# Patient Record
Sex: FEMALE | Race: OTHER | HISPANIC OR LATINO | URBAN - METROPOLITAN AREA
[De-identification: names, ages, dates, MRNs, and addresses within clinical notes are randomized per-mention and may not be internally consistent; named-entity substitution may affect disease eponyms.]

---

## 2020-08-19 ENCOUNTER — EMERGENCY (EMERGENCY)
Facility: HOSPITAL | Age: 65
LOS: 1 days | Discharge: HOME | End: 2020-08-19
Attending: INTERNAL MEDICINE | Admitting: INTERNAL MEDICINE
Payer: MEDICAID

## 2020-08-19 VITALS
DIASTOLIC BLOOD PRESSURE: 82 MMHG | TEMPERATURE: 98 F | HEART RATE: 59 BPM | SYSTOLIC BLOOD PRESSURE: 148 MMHG | RESPIRATION RATE: 16 BRPM | OXYGEN SATURATION: 98 %

## 2020-08-19 VITALS
RESPIRATION RATE: 16 BRPM | SYSTOLIC BLOOD PRESSURE: 126 MMHG | TEMPERATURE: 99 F | DIASTOLIC BLOOD PRESSURE: 72 MMHG | HEART RATE: 66 BPM

## 2020-08-19 PROCEDURE — 73030 X-RAY EXAM OF SHOULDER: CPT | Mod: 26,LT

## 2020-08-19 PROCEDURE — 71046 X-RAY EXAM CHEST 2 VIEWS: CPT | Mod: 26

## 2020-08-19 PROCEDURE — 99283 EMERGENCY DEPT VISIT LOW MDM: CPT

## 2020-08-19 PROCEDURE — 73060 X-RAY EXAM OF HUMERUS: CPT | Mod: 26,LT

## 2020-08-19 RX ORDER — ACETAMINOPHEN 500 MG
2 TABLET ORAL
Qty: 24 | Refills: 0
Start: 2020-08-19

## 2020-08-19 RX ORDER — IBUPROFEN 200 MG
1 TABLET ORAL
Qty: 24 | Refills: 0
Start: 2020-08-19

## 2020-08-19 RX ORDER — IBUPROFEN 200 MG
600 TABLET ORAL ONCE
Refills: 0 | Status: COMPLETED | OUTPATIENT
Start: 2020-08-19 | End: 2020-08-19

## 2020-08-19 RX ADMIN — Medication 600 MILLIGRAM(S): at 13:48

## 2020-08-19 NOTE — ED PROVIDER NOTE - PATIENT PORTAL LINK FT
You can access the FollowMyHealth Patient Portal offered by [facility] by registering at the following website: [email or web address]. By joining [email or web address]’s FollowMyHealth portal, you will also be able to view your health information using other applications (apps) compatible with our system.

## 2020-08-19 NOTE — ED PROVIDER NOTE - NSFOLLOWUPINSTRUCTIONS_ED_ALL_ED_FT
Please follow up with your primary medical doctor within two days.  No fracture was seen on your x-rays during this emergency department visit, however occasionally small fractures may not be visible on initial x-ray, and you should consider obtaining repeat x-rays if your pain persists.  Continue to wear the sling which you were provided as you were instructed, apply ice, rest the limb and refrain from performing heavy lifting with it until your symptoms improve.  Return to the emergency department if you feel that your condition is worsening or if you experience numbness or coldness of the affect limb, paresthesias, or weakness.

## 2020-08-19 NOTE — ED PROVIDER NOTE - PROGRESS NOTE DETAILS
Dr. Mcgee: Patient feels improved.  X-rays negative for fracture.  Sling applied and the patient was educated in its use as well as aftercare for her arm.  Has a primary medical doctor and advises that she will be able to follow-up in two days.  The possibility of an occult fracture on initial x-ray was discussed and she was made aware that repeat x-rays should be obtained if pain persists.  Return precautions discussed.

## 2020-08-19 NOTE — ED ADULT TRIAGE NOTE - CHIEF COMPLAINT QUOTE
Pt c/o intermittent sharp pain to left shoulder blade area worse with movement s/p slip and fall at [employer], washing machine broke her fall Pt denies impact to head, LOC no lacerations or signs of trauma noted,

## 2020-08-19 NOTE — ED ADULT NURSE NOTE - OBJECTIVE STATEMENT
Pt presents to intake, A&Ox4, ambulatory w/o assistance, here for evaluation of left shoulder and upper back pain after a fall today. Pt denies head injury and LOC. Denies chest pain, shortness of breath, palpitations, diaphoresis, headaches, fevers, dizziness, nausea, vomiting, diarrhea, or urinary symptoms at this time. No IV placed at this time. Meds given as ordered. Will continue to monitor.

## 2020-08-19 NOTE — ED PROVIDER NOTE - OBJECTIVE STATEMENT
66 y/o F no PMHx here c/o left shoulder pain x 1 day. States slipped at the laundromat today, falling flat on her back and hitting the back of her left shoulder in the process. Able to move the shoulder, but with pain. No numbness/weakness/tingling. No head trauma. No other injuries. No LOC. Denies chest pain, shortness of breath, or any other complaints.

## 2020-08-19 NOTE — ED PROVIDER NOTE - PHYSICAL EXAMINATION
Vital signs reviewed.   CONSTITUTIONAL: Well-appearing; well-nourished; in no apparent distress. Non-toxic appearing.   HEAD: Normocephalic, atraumatic.  EYES: PERRL, EOM intact, conjunctiva and sclera WNL.  NECK/LYMPH: Supple; non-tender; no cervical lymphadenopathy.  CARD: Normal S1, S2; RRR  RESP: Normal chest excursion with respiration; breath sounds clear and equal bilaterally; no wheezes, rhonchi, or rales.  EXT/MS: moves all extremities; NO SPINAL TENDERNESS, distal pulses are normal, no pedal edema.  L SHOULDER: no swelling or visible deformity, tenderness on palpation of anterior shoulder and proximal humerus, full passive ROM but with notable pain, sensation intact, radial pulse 2+, NVI,  strength wnl  SKIN: Normal for age and race; warm; dry; good turgor; no apparent lesions or exudate noted.  NEURO: Awake, alert, oriented x 3, no gross deficits, CN II-XII grossly intact, no motor or sensory deficit noted.  PSYCH: Normal mood; appropriate affect.

## 2020-08-19 NOTE — ED PROVIDER NOTE - ATTENDING CONTRIBUTION TO CARE
Awake, Alert, Conversant.  Resting comfortably.  No skull trauma.  Neck without C-spine tenderness, full ROM intact.  Breath sounds clear in all lung fields.  Normal and regular heart rate without murmurs, rubs, or gallops.  Normal S1/S2.  Abdomen soft and nontender.  Left upper arm tender near humeral head, abduction nad adduction limited by pain, extension/flexion slow but intact.  B/L radial pulses 2+, sensation and movement intact to B/L hands.  no spinal tenderness. Pelvis stable.  No lower extremity swelling or tenderness.  Ambulates well.    Oriented and conversant with fluent speech, moving all extremities with good strength.    Dr. Mcgee: I agree with the above provided history and exam and addend/modify it as follows.  65 Denies pMHx P/W L upper arm pain following mechanical fall with direct injury, denies other areas of injury or pain.  No coldness of the affect limb or loss of sensation.  Will evaluate for fracture, possible bone bruise vs muscular strain.  Neurovascularly intact.   Plan for analgesia, imaging, re-eval.    I Kayden Mcgee MD performed a history and physical exam of the patient and discussed their management with the resident and /or advanced care provider. I reviewed the resident and /or ACP's note and agree with the documented findings and plan of care. My medical decision making and observations are found above.

## 2021-07-08 ENCOUNTER — EMERGENCY (EMERGENCY)
Facility: HOSPITAL | Age: 66
LOS: 1 days | Discharge: HOME | End: 2021-07-08
Attending: EMERGENCY MEDICINE | Admitting: EMERGENCY MEDICINE
Payer: MEDICARE

## 2021-07-08 VITALS
DIASTOLIC BLOOD PRESSURE: 85 MMHG | OXYGEN SATURATION: 98 % | RESPIRATION RATE: 18 BRPM | HEART RATE: 85 BPM | TEMPERATURE: 98 F | SYSTOLIC BLOOD PRESSURE: 125 MMHG

## 2021-07-08 VITALS
OXYGEN SATURATION: 100 % | SYSTOLIC BLOOD PRESSURE: 128 MMHG | DIASTOLIC BLOOD PRESSURE: 71 MMHG | RESPIRATION RATE: 16 BRPM | HEART RATE: 53 BPM

## 2021-07-08 PROBLEM — Z78.9 OTHER SPECIFIED HEALTH STATUS: Chronic | Status: ACTIVE | Noted: 2020-08-20

## 2021-07-08 LAB
ALBUMIN SERPL ELPH-MCNC: 4.3 G/DL — SIGNIFICANT CHANGE UP (ref 3.3–5)
ALP SERPL-CCNC: 102 U/L — SIGNIFICANT CHANGE UP (ref 40–120)
ALT FLD-CCNC: 17 U/L — SIGNIFICANT CHANGE UP (ref 4–33)
ANION GAP SERPL CALC-SCNC: 10 MMOL/L — SIGNIFICANT CHANGE UP (ref 7–14)
AST SERPL-CCNC: 19 U/L — SIGNIFICANT CHANGE UP (ref 4–32)
BASOPHILS # BLD AUTO: 0.03 K/UL — SIGNIFICANT CHANGE UP (ref 0–0.2)
BASOPHILS NFR BLD AUTO: 0.5 % — SIGNIFICANT CHANGE UP (ref 0–2)
BILIRUB SERPL-MCNC: 0.4 MG/DL — SIGNIFICANT CHANGE UP (ref 0.2–1.2)
BUN SERPL-MCNC: 13 MG/DL — SIGNIFICANT CHANGE UP (ref 7–23)
CALCIUM SERPL-MCNC: 9.5 MG/DL — SIGNIFICANT CHANGE UP (ref 8.4–10.5)
CHLORIDE SERPL-SCNC: 104 MMOL/L — SIGNIFICANT CHANGE UP (ref 98–107)
CO2 SERPL-SCNC: 25 MMOL/L — SIGNIFICANT CHANGE UP (ref 22–31)
CREAT SERPL-MCNC: 0.64 MG/DL — SIGNIFICANT CHANGE UP (ref 0.5–1.3)
EOSINOPHIL # BLD AUTO: 0.12 K/UL — SIGNIFICANT CHANGE UP (ref 0–0.5)
EOSINOPHIL NFR BLD AUTO: 1.9 % — SIGNIFICANT CHANGE UP (ref 0–6)
GLUCOSE SERPL-MCNC: 86 MG/DL — SIGNIFICANT CHANGE UP (ref 70–99)
HCT VFR BLD CALC: 40.8 % — SIGNIFICANT CHANGE UP (ref 34.5–45)
HGB BLD-MCNC: 12.2 G/DL — SIGNIFICANT CHANGE UP (ref 11.5–15.5)
IANC: 3.71 K/UL — SIGNIFICANT CHANGE UP (ref 1.5–8.5)
IMM GRANULOCYTES NFR BLD AUTO: 0.3 % — SIGNIFICANT CHANGE UP (ref 0–1.5)
LYMPHOCYTES # BLD AUTO: 2.01 K/UL — SIGNIFICANT CHANGE UP (ref 1–3.3)
LYMPHOCYTES # BLD AUTO: 31.6 % — SIGNIFICANT CHANGE UP (ref 13–44)
MCHC RBC-ENTMCNC: 24.6 PG — LOW (ref 27–34)
MCHC RBC-ENTMCNC: 29.9 GM/DL — LOW (ref 32–36)
MCV RBC AUTO: 82.3 FL — SIGNIFICANT CHANGE UP (ref 80–100)
MONOCYTES # BLD AUTO: 0.47 K/UL — SIGNIFICANT CHANGE UP (ref 0–0.9)
MONOCYTES NFR BLD AUTO: 7.4 % — SIGNIFICANT CHANGE UP (ref 2–14)
NEUTROPHILS # BLD AUTO: 3.71 K/UL — SIGNIFICANT CHANGE UP (ref 1.8–7.4)
NEUTROPHILS NFR BLD AUTO: 58.3 % — SIGNIFICANT CHANGE UP (ref 43–77)
NRBC # BLD: 0 /100 WBCS — SIGNIFICANT CHANGE UP
NRBC # FLD: 0 K/UL — SIGNIFICANT CHANGE UP
PLATELET # BLD AUTO: 271 K/UL — SIGNIFICANT CHANGE UP (ref 150–400)
POTASSIUM SERPL-MCNC: 4.3 MMOL/L — SIGNIFICANT CHANGE UP (ref 3.5–5.3)
POTASSIUM SERPL-SCNC: 4.3 MMOL/L — SIGNIFICANT CHANGE UP (ref 3.5–5.3)
PROT SERPL-MCNC: 7.5 G/DL — SIGNIFICANT CHANGE UP (ref 6–8.3)
RBC # BLD: 4.96 M/UL — SIGNIFICANT CHANGE UP (ref 3.8–5.2)
RBC # FLD: 14.3 % — SIGNIFICANT CHANGE UP (ref 10.3–14.5)
SODIUM SERPL-SCNC: 139 MMOL/L — SIGNIFICANT CHANGE UP (ref 135–145)
WBC # BLD: 6.36 K/UL — SIGNIFICANT CHANGE UP (ref 3.8–10.5)
WBC # FLD AUTO: 6.36 K/UL — SIGNIFICANT CHANGE UP (ref 3.8–10.5)

## 2021-07-08 PROCEDURE — 70450 CT HEAD/BRAIN W/O DYE: CPT | Mod: 26

## 2021-07-08 PROCEDURE — 99285 EMERGENCY DEPT VISIT HI MDM: CPT

## 2021-07-08 RX ORDER — SODIUM CHLORIDE 9 MG/ML
1000 INJECTION INTRAMUSCULAR; INTRAVENOUS; SUBCUTANEOUS ONCE
Refills: 0 | Status: COMPLETED | OUTPATIENT
Start: 2021-07-08 | End: 2021-07-08

## 2021-07-08 RX ORDER — METOCLOPRAMIDE HCL 10 MG
10 TABLET ORAL ONCE
Refills: 0 | Status: COMPLETED | OUTPATIENT
Start: 2021-07-08 | End: 2021-07-08

## 2021-07-08 RX ADMIN — Medication 10 MILLIGRAM(S): at 11:38

## 2021-07-08 RX ADMIN — SODIUM CHLORIDE 1000 MILLILITER(S): 9 INJECTION INTRAMUSCULAR; INTRAVENOUS; SUBCUTANEOUS at 11:38

## 2021-07-08 NOTE — ED ADULT NURSE NOTE - OBJECTIVE STATEMENT
pt c/o of posterior head pain, denies vision changes. respirations even/unlabored, nad noted, 20g iv to right ac, labs drawn and snet

## 2021-07-08 NOTE — ED PROVIDER NOTE - PHYSICAL EXAMINATION
Gen - NAD; well-appearing but intermittently in pain; A+Ox3   HEENT - NCAT, EOMI, PERRL  Neck - supple, no nuchal rigidity, FROM  Resp - CTAB  CV -  RRR  Abd - soft, NT, ND; no guarding or rebound  MSK - 5/5 strength and FROM b/l UE and LE  Extrem - no LE edema/erythema/tenderness  Neuro - no focal motor or sensation deficits, normal gait, CN 2-12 grossly intact  Skin - no apparent vesicles/rash over scalp

## 2021-07-08 NOTE — ED PROVIDER NOTE - PATIENT PORTAL LINK FT
You can access the FollowMyHealth Patient Portal offered by [facility] by registering at the following website: [email or web address]. By joining [employer]’s FollowMyHealth portal, you will also be able to view your health information using other applications (apps) compatible with our system.

## 2021-07-08 NOTE — ED ADULT TRIAGE NOTE - CHIEF COMPLAINT QUOTE
p/t c/o of headaches since yesterday, denies any trauma, no neuro deficits noted, p/t ambulatory with steady gait

## 2021-07-08 NOTE — ED PROVIDER NOTE - OBJECTIVE STATEMENT
66 year old female with history of chronic L thigh numbness and hand shaking, presenting with headache x 1d. States having sudden onset @ 1 pm yesterday of R sided posterior headache, described as intermittent shocks lasting seconds, significantly debilitating. Unable to sleep well overnight. Denies fevers/chills, neck stiffness, vision changes, focal weakness, N/V. Took 2 tylenols yesterday without improvement. No hx of headaches.

## 2021-07-08 NOTE — ED PROVIDER NOTE - NSFOLLOWUPINSTRUCTIONS_ED_ALL_ED_FT
You were seen in the Emergency Department for: headache    For pain/fever, you may take Tylenol (acetaminophen) 1000 mg every 6 hours, OR Advil (ibuprofen) 600 mg every 8 hours.    Please follow up with your primary physician as discussed for findings on your head CT scan, as well as with a neurologist for your headache. If you do not have a primary physician or specialist of your needs, please call [phone] to find one convenient for you. At this number you will be able to locate a provider who accepts your insurance, as well as locate the right specialist for your needs.    You should return to the Emergency Department if you feel any new/worsening/persistent symptoms including but not limited to: chest pain, difficulty breathing, loss of consciousness, bleeding, uncontrolled pain, numbness/weakness of a body part

## 2021-07-08 NOTE — ED PROVIDER NOTE - PROGRESS NOTE DETAILS
Ernie PGY-2: Patient reassessed, states significant improvement s/p reglan, agreeable to discharge home with neurology follow up, has neurologist. Discussed incidental findings on CTH of lytic lesions, patient expresses clear understanding of need for urgent follow up with PMD.

## 2021-07-08 NOTE — ED PROVIDER NOTE - CLINICAL SUMMARY MEDICAL DECISION MAKING FREE TEXT BOX
66 year old female with history of chronic L thigh numbness and hand shaking, presenting with headache x 1d. 66 year old female with history of chronic L thigh numbness and hand shaking, presenting with headache x 1d.  Concern for migraine/neuralgia  Labs, CT, Analgesia

## 2021-07-08 NOTE — ED PROVIDER NOTE - NS ED ROS FT
Gen: No fever, no chills  CV: No chest pain, no palpitations  Skin: No rash, no color changes  Resp: No SOB, no cough  GI: No constipation, no diarrhea, no nausea, no vomiting  Msk: No back pain, no LE swelling, no extremity pain  : No dysuria, no increased frequency  Neuro: No LOC, no weakness, +numbness

## 2021-07-08 NOTE — ED PROVIDER NOTE - ATTENDING CONTRIBUTION TO CARE
Pt was seen and evaluated by me. Pt is a 67 y/o female with PMHx of chronic L thigh numbness and hand shaking who presented to the ED for left sided posterior headache X 1 day. Pt states over the past day having intermittent left sided headache described as shocks lasting seconds. Pt denies any falls or direct trauma. Pt denies any neck pain, vision changes, fever, chills, nausea, vomiting, SOB, chest pain, or abd pain. Lungs CTA b/l. RRR. Abd soft, non-tender. No temporal tenderness. 5/5 b/l UE.  Concern for migraine/neuralgia  Labs, CT, Analgesia

## 2022-07-26 PROBLEM — Z00.00 ENCOUNTER FOR PREVENTIVE HEALTH EXAMINATION: Status: ACTIVE | Noted: 2022-07-26

## 2022-07-28 ENCOUNTER — APPOINTMENT (OUTPATIENT)
Dept: OTOLARYNGOLOGY | Facility: CLINIC | Age: 67
End: 2022-07-28

## 2022-07-28 VITALS
DIASTOLIC BLOOD PRESSURE: 78 MMHG | WEIGHT: 158 LBS | HEIGHT: 60 IN | TEMPERATURE: 97.7 F | SYSTOLIC BLOOD PRESSURE: 129 MMHG | HEART RATE: 56 BPM | BODY MASS INDEX: 31.02 KG/M2

## 2022-07-28 DIAGNOSIS — Z86.39 PERSONAL HISTORY OF OTHER ENDOCRINE, NUTRITIONAL AND METABOLIC DISEASE: ICD-10-CM

## 2022-07-28 PROCEDURE — 92557 COMPREHENSIVE HEARING TEST: CPT

## 2022-07-28 PROCEDURE — 99203 OFFICE O/P NEW LOW 30 MIN: CPT | Mod: 25

## 2022-07-28 PROCEDURE — 92567 TYMPANOMETRY: CPT

## 2022-07-28 PROCEDURE — 31231 NASAL ENDOSCOPY DX: CPT

## 2022-07-28 RX ORDER — CEFUROXIME AXETIL 250 MG/1
250 TABLET ORAL
Qty: 20 | Refills: 0 | Status: DISCONTINUED | COMMUNITY
Start: 2022-07-13

## 2022-07-28 RX ORDER — MOMETASONE FUROATE 1 MG/G
0.1 OINTMENT TOPICAL
Qty: 15 | Refills: 0 | Status: ACTIVE | COMMUNITY
Start: 2022-06-22

## 2022-07-31 RX ORDER — FLUTICASONE PROPIONATE 50 UG/1
50 SPRAY, METERED NASAL DAILY
Qty: 1 | Refills: 3 | Status: ACTIVE | COMMUNITY
Start: 2022-07-31 | End: 1900-01-01

## 2022-07-31 NOTE — ASSESSMENT
[FreeTextEntry1] : 67Y F with history of multiple right MT for recurrent Right Middle ear effusions. Nasal endoscopy shows no gross lesion at nasal entrance of euchasian tuve. Audiogram shows AS Hearing WNL from 250-4k hz with mild HL after 4khz. AD Moderate to severe -8khz. AD Tymp B. AS Tymp As \par \par Recommend\par Right Conductive Hearing Loss 2/2 Recurrent Right Middle ear effusion\par - Discussed at length that ear fluid itself is a result of a mechanical problem due to swelling and inflammation after URIs/Ear infections and that if not infected fluid that we often don't treat with antibiotics. \par -The underlying issues is Eustachian tube dysfunction which can be transient in which we just wait for viral illnesses to run their course. If the ETD is chronic that is when we discuss possible ear tubes. \par -Send to Pharmacy Flonase nasal sprays to decrease possible inflammation at the open of the eustachian tube on the side of the nose to allow for better drainage\par -Instructed to try over the counter Otovent for ETD exercise\par -Referral to Dr. Cosme Fisher for evaluation of possible Right Myringotomy with Tube\par \par -Return to clinic as needed or sooner if new/worsen symptoms present\par

## 2022-07-31 NOTE — DATA REVIEWED
[de-identified] : An audiogram was ordered and performed including pure tones, tympanometry and speech testing for the patients complaint of hearing loss\par I have independently reviewed the patient's audiogram from today and my findings include AS Hearing WNL from 250-4k hz with mild HL after 4khz. AD Moderate to severe -8khz. AD Tymp B. AS Tymp As

## 2022-07-31 NOTE — PHYSICAL EXAM
[Hearing Loss Right Only] : diminished [Rinne Test Air Conduction Persists > Bone Conduction Right] : bone conduction greater than air conduction on the right [de-identified] :  Myringosclerosis [de-identified] : Middle Ear Effusion. [Nasal Endoscopy Performed] : nasal endoscopy was performed, see procedure section for findings [Normal] : mucosa is normal [Midline] : trachea located in midline position

## 2022-08-31 ENCOUNTER — APPOINTMENT (OUTPATIENT)
Dept: OTOLARYNGOLOGY | Facility: CLINIC | Age: 67
End: 2022-08-31

## 2022-08-31 PROCEDURE — 99214 OFFICE O/P EST MOD 30 MIN: CPT | Mod: 25

## 2022-08-31 PROCEDURE — 69433 CREATE EARDRUM OPENING: CPT | Mod: RT

## 2022-08-31 RX ORDER — OFLOXACIN OTIC 3 MG/ML
0.3 SOLUTION AURICULAR (OTIC) TWICE DAILY
Qty: 14 | Refills: 1 | Status: ACTIVE | COMMUNITY
Start: 2022-08-31 | End: 1900-01-01

## 2022-08-31 NOTE — ASSESSMENT
[FreeTextEntry1] : Discussed options for management of recurrent right serous otitis media and hearing loss, including oral steroids versus repeat right ear tube insertion.  Counseled regarding risk of residual perforation, which is higher in patients with multiple prior ear tube insertions, and she expressed understanding.  She wishes to proceed with tube insertion for immediate relief of symptoms, performed today without complication.  Rx Floxin drops for 7 days, follow-up 1 month to confirm hearing improvement and resolution of drainage.

## 2022-08-31 NOTE — PROCEDURE
[FreeTextEntry3] : Procedure note: Right myringotomy and tube insertion\par \par Aug 31, 2022 \par \par Description of Operative Procedure:  Risks, benefits, and alternatives of the planned procedure were discussed with the patient prior to proceeding.  Risks would include but are not limited to bleeding, infection, persistent drainage, persistent perforation, or failure to improve hearing.  Benefits would include improvement in hearing.  Topical anesthetic was used to anesthetize the tympanic membrane.  A myringotomy was made.  Serous fluid was suctioned.  A size 1.14 Paparella tube was placed followed by Ciprodex drops.  The patient tolerated the procedure without complications.

## 2022-08-31 NOTE — HISTORY OF PRESENT ILLNESS
[de-identified] : 67 year old female referred by Dr. Cortez Rossi  presents with an evaluation for right ear otorrhea and right hearing Right hearing loss started for the past 3 months\par History s/p multiple Right myringotomy tube several years ago for recurrent Right MARTI, repeat tubes placed but states tympanic membrane is weak, told that another tube insertion may cause issues\par Right ear infection 1989 need MT tubes. 1999\par Currently reports constant Right tinnitus, subsequent decreased hearing\par Continues to use Flonase \par Denies otalgia, dizziness, vertigo, headaches related to ears, recent fevers or ear infections\par No hearing aids used. No recent imaging studies. s/p CT scan 4 years ago \par Audio completed 08/31/22\par

## 2022-08-31 NOTE — PHYSICAL EXAM
[Binocular Microscopic Exam] : Binocular microscopic exam was performed [Normal] : the left mastoid was normal [de-identified] : fluid

## 2022-10-05 ENCOUNTER — APPOINTMENT (OUTPATIENT)
Dept: OTOLARYNGOLOGY | Facility: CLINIC | Age: 67
End: 2022-10-05

## 2022-10-05 VITALS
BODY MASS INDEX: 29.84 KG/M2 | HEIGHT: 60 IN | HEART RATE: 62 BPM | WEIGHT: 152 LBS | SYSTOLIC BLOOD PRESSURE: 115 MMHG | DIASTOLIC BLOOD PRESSURE: 75 MMHG

## 2022-10-05 PROCEDURE — 92557 COMPREHENSIVE HEARING TEST: CPT

## 2022-10-05 PROCEDURE — 99213 OFFICE O/P EST LOW 20 MIN: CPT | Mod: 25

## 2022-10-05 PROCEDURE — 92504 EAR MICROSCOPY EXAMINATION: CPT

## 2022-10-05 PROCEDURE — 92567 TYMPANOMETRY: CPT

## 2022-10-06 NOTE — REASON FOR VISIT
[Subsequent Evaluation] : a subsequent evaluation for [FreeTextEntry2] : recurrent right serous otitis media and hearing loss

## 2022-10-06 NOTE — ASSESSMENT
[FreeTextEntry1] : Reports hearing improvement since tube placement, no drainage.  Exam today shows dry patent right PE tube, otherwise normal-appearing tympanic membrane.  Left tympanic membrane intact without effusion or retraction.  I personally ordered and reviewed an audiogram for her hearing loss, which shows improved right mixed hearing loss across all frequencies.\par \par Impression is improved right mixed hearing loss and eustachian tube dysfunction.  Continue maintenance of dry ear precautions for right ear.  Follow-up 6 months or sooner with change in ear symptoms.

## 2022-10-06 NOTE — HISTORY OF PRESENT ILLNESS
[de-identified] : 67 year old female presents for follow-up recurrent right serous otitis media and hearing loss\par s/p right myringotomy tube placement last clinic visit. \par Reports she use Ofloxacin drops 2x daily for two weeks--would like to know if she needs to continue it. \par Reports constant right tinnitus--softer in sound since tube insertion. \par States her hearing has improved since tube insertion. \par Denies otalgia, otorrhea, recent fevers or ear infections, dizziness, vertigo, headaches related to hearing.

## 2024-03-14 ENCOUNTER — APPOINTMENT (OUTPATIENT)
Dept: OTOLARYNGOLOGY | Facility: CLINIC | Age: 69
End: 2024-03-14
Payer: MEDICARE

## 2024-03-14 PROCEDURE — 99214 OFFICE O/P EST MOD 30 MIN: CPT | Mod: 25

## 2024-03-14 PROCEDURE — 69433 CREATE EARDRUM OPENING: CPT

## 2024-03-14 NOTE — PROCEDURE
[FreeTextEntry3] : Procedure note: Right myringotomy and tube insertion  Description of Operative Procedure:  Risks, benefits, and alternatives of the planned procedure were discussed with the patient prior to proceeding.  Risks would include but are not limited to bleeding, infection, persistent drainage, persistent perforation, or failure to improve hearing.  Benefits would include improvement in hearing.  Topical anesthetic was used to anesthetize the tympanic membrane.  A myringotomy was made.  Serous fluid was suctioned.  A modified Salas T tube was placed followed by antibiotic drops.  The patient tolerated the procedure without complications.

## 2024-03-14 NOTE — HISTORY OF PRESENT ILLNESS
[de-identified] : 69 y/o F presents for evaluation of right sided hearing loss and recent infection reports right ear drainage and otalgia along with right facial pain 2 weeks ago seen by PCP prescribed Amoxicillin 500mg x5 days drainage and pain has since resolved reports decreased hearing in the right ear; also reports constant right sided tinnitus Patient denies current otalgia, otorrhea, dizziness, vertigo, headaches related to hearing.

## 2024-03-14 NOTE — ASSESSMENT
[FreeTextEntry1] : Exam today shows intact left tympanic membrane without effusion or retraction, right tympanic membrane intact but with julee-colored serous effusion medially.  Patient has had more than 5 ear tube in the right ear over the course the years, and would like repeat tube insertion today.  Given she has had 70 prior sets of tubes, offered right T-tube insertion, but prior to this also discussed high risk of residual perforation.  She expressed understanding and wishes to proceed.  Tube placed without complication, Rx Floxin drops for 7 days, follow-up 1 month to ensure improvement.

## 2024-04-17 ENCOUNTER — APPOINTMENT (OUTPATIENT)
Dept: OTOLARYNGOLOGY | Facility: CLINIC | Age: 69
End: 2024-04-17
Payer: MEDICARE

## 2024-04-17 VITALS
HEIGHT: 64 IN | WEIGHT: 150 LBS | SYSTOLIC BLOOD PRESSURE: 117 MMHG | BODY MASS INDEX: 25.61 KG/M2 | DIASTOLIC BLOOD PRESSURE: 79 MMHG | HEART RATE: 60 BPM

## 2024-04-17 DIAGNOSIS — H90.5 UNSPECIFIED SENSORINEURAL HEARING LOSS: ICD-10-CM

## 2024-04-17 DIAGNOSIS — H65.91 UNSPECIFIED NONSUPPURATIVE OTITIS MEDIA, RIGHT EAR: ICD-10-CM

## 2024-04-17 DIAGNOSIS — H91.91 UNSPECIFIED HEARING LOSS, RIGHT EAR: ICD-10-CM

## 2024-04-17 DIAGNOSIS — H93.11 TINNITUS, RIGHT EAR: ICD-10-CM

## 2024-04-17 DIAGNOSIS — H90.11 CONDUCTIVE HEARING LOSS, UNILATERAL, RIGHT EAR, WITH UNRESTRICTED HEARING ON THE CONTRALATERAL SIDE: ICD-10-CM

## 2024-04-17 PROCEDURE — 92557 COMPREHENSIVE HEARING TEST: CPT

## 2024-04-17 PROCEDURE — 92567 TYMPANOMETRY: CPT

## 2024-04-17 PROCEDURE — 99213 OFFICE O/P EST LOW 20 MIN: CPT | Mod: 25

## 2024-04-17 PROCEDURE — 92504 EAR MICROSCOPY EXAMINATION: CPT

## 2024-04-17 RX ORDER — ATORVASTATIN CALCIUM 20 MG/1
20 TABLET, FILM COATED ORAL
Qty: 30 | Refills: 0 | Status: COMPLETED | COMMUNITY
Start: 2022-07-13 | End: 2024-04-17

## 2024-04-17 NOTE — HISTORY OF PRESENT ILLNESS
[de-identified] : 69 year old female with history of right sided hearing loss.  LCV 3/14/24- Left tympanic membrane without effusion and right tympanic julee-colored serous effusion.  Prescribed oflox drops-completed with relief.  Requesting refill for Ofloxacin drops. States hearing has improved.  MRI of Brain and IACs completed 3/18/24.

## 2024-04-17 NOTE — ASSESSMENT
[FreeTextEntry1] : Exam today shows patent dry right PE tube, intact left tympanic membrane without effusion.  I reviewed an audiogram which shows mild right low-frequency conductive loss.  I also reviewed her recent MRI images and the report, which shows minor opacification right mastoid cavity.  Maintain dry ear precautions right ear, follow-up annually for tube check or as needed.

## 2024-11-13 NOTE — ED PROVIDER NOTE - CROS ED SKIN ALL NEG
----- Message from Summer sent at 11/13/2024  9:51 AM CST -----  .Type:  Patient Call Back    Who Called: DAD       Does the patient know what this is regarding?: PIA IS REQUESTING A DOCTOR'S NOTE FOR PT VISIT ON 11/7/2024. PLEASE FAX IT TO THE [facility] FAX BELOW     Would the patient rather a call back     Best Call Back Number: PIA  [phone]         Additional Information:[facility] FAX  [phone]       Thank You   negative...

## 2024-12-04 ENCOUNTER — APPOINTMENT (OUTPATIENT)
Dept: OTOLARYNGOLOGY | Facility: CLINIC | Age: 69
End: 2024-12-04
Payer: MEDICARE

## 2024-12-04 VITALS
HEART RATE: 59 BPM | BODY MASS INDEX: 25.61 KG/M2 | SYSTOLIC BLOOD PRESSURE: 141 MMHG | HEIGHT: 64 IN | DIASTOLIC BLOOD PRESSURE: 82 MMHG | WEIGHT: 150 LBS

## 2024-12-04 DIAGNOSIS — H93.11 TINNITUS, RIGHT EAR: ICD-10-CM

## 2024-12-04 DIAGNOSIS — H90.11 CONDUCTIVE HEARING LOSS, UNILATERAL, RIGHT EAR, WITH UNRESTRICTED HEARING ON THE CONTRALATERAL SIDE: ICD-10-CM

## 2024-12-04 DIAGNOSIS — H65.91 UNSPECIFIED NONSUPPURATIVE OTITIS MEDIA, RIGHT EAR: ICD-10-CM

## 2024-12-04 DIAGNOSIS — H91.91 UNSPECIFIED HEARING LOSS, RIGHT EAR: ICD-10-CM

## 2024-12-04 DIAGNOSIS — H90.5 UNSPECIFIED SENSORINEURAL HEARING LOSS: ICD-10-CM

## 2024-12-04 PROCEDURE — 92504 EAR MICROSCOPY EXAMINATION: CPT

## 2024-12-04 PROCEDURE — 99213 OFFICE O/P EST LOW 20 MIN: CPT | Mod: 25

## 2025-06-17 ENCOUNTER — NON-APPOINTMENT (OUTPATIENT)
Age: 70
End: 2025-06-17

## 2025-06-18 ENCOUNTER — APPOINTMENT (OUTPATIENT)
Dept: OTOLARYNGOLOGY | Facility: CLINIC | Age: 70
End: 2025-06-18

## 2025-06-18 VITALS
DIASTOLIC BLOOD PRESSURE: 83 MMHG | SYSTOLIC BLOOD PRESSURE: 137 MMHG | HEART RATE: 62 BPM | HEIGHT: 62 IN | WEIGHT: 152 LBS | BODY MASS INDEX: 27.97 KG/M2

## 2025-06-18 PROCEDURE — 99213 OFFICE O/P EST LOW 20 MIN: CPT | Mod: 25

## 2025-06-18 PROCEDURE — 31231 NASAL ENDOSCOPY DX: CPT

## 2025-06-18 PROCEDURE — 30901 CONTROL OF NOSEBLEED: CPT | Mod: RT

## 2025-06-18 PROCEDURE — 92504 EAR MICROSCOPY EXAMINATION: CPT
